# Patient Record
(demographics unavailable — no encounter records)

---

## 2024-12-20 NOTE — HISTORY OF PRESENT ILLNESS
[Result of repetitive motion] : result of repetitive motion [3] : 3 [2] : 2 [Localized] : localized [Leisure] : leisure [Social interactions] : social interactions [Rest] : rest [Full time] : Work status: full time [de-identified] : 34 year old RHD male with pain in the left shoulder, symptoms started a couple months ago after playing golf. Recurred for 5 or 6 days. Pain with reaching over head, behind back and lifting, pushing. No radicular complaints. He denies prior history of trouble with the shoulder.  Aleve and tylenol with some help [] : no [FreeTextEntry1] : l shoulder [FreeTextEntry5] : shoulder pain for a week ; was golfing 2 months ago and poss from swinging golf club but only had pain for a few days  [FreeTextEntry9] : Aleve [de-identified] : over stretching  [de-identified] :

## 2024-12-20 NOTE — DISCUSSION/SUMMARY
[de-identified] : MRI left shoulder eval for labral tear   RE:  KATHERIN SORENSON   Acct #- [id number]   Attention:  Nurse Reviewer /Medical Director    Based on my patient's condition, I strongly believe that the MRI is medically.necessary.   The patient has failed oral meds, injections and PT and conservative treatment in combination or by themselves and therefore needs the MRI.   The MRI will dictate further treatment t recommendations. modify activities try OTC meds ice as needed try topical lidocaine for pain control reviewed current medications used by this patient home exercises for functional return

## 2024-12-20 NOTE — PHYSICAL EXAM
[Sitting] : sitting [Mild] : mild [5___] : external rotation 5[unfilled]/5 [Left] : left shoulder [There are no fractures, subluxations or dislocations. No significant abnormalities are seen] : There are no fractures, subluxations or dislocations. No significant abnormalities are seen [] : positive anterior shift [TWNoteComboBox7] : active forward flexion 165 degrees [TWNoteComboBox6] : internal rotation L4 [de-identified] : external rotation 80 degrees

## 2024-12-27 NOTE — DISCUSSION/SUMMARY
[de-identified] : modify activities use elastic sleeve for structural support try OTC meds ice as needed try topical lidocaine for pain control reviewed current medications used by this patient home exercises for functional returnposs surg decompression of cyst and labral repair on US I did not cyst to be aspirated so CSI was done  12/27/2024    RE:  KATHERINMT SORENSON   Acct #- [id number]    Attention:  Nurse Reviewer /Medical Director  I am writing this letter as a medical necessity for PT program. Patient has tried analgesics, non-steroid anti-inflammatory agents,  hot or cold compresses,injections of corticosteroids, etc)  which in combination or by themselves has not worked. Based on my patient's condition, I strongly believe that the PT is medically needed.   Thank you for your time and consideration.     will try csi

## 2024-12-27 NOTE — HISTORY OF PRESENT ILLNESS
[3] : 3 [Localized] : localized [Nothing helps with pain getting better] : Nothing helps with pain getting better [Full time] : Work status: full time [Result of repetitive motion] : result of repetitive motion [2] : 2 [Leisure] : leisure [Social interactions] : social interactions [Rest] : rest [de-identified] : pain in the left shoulder, made symptoms worse yesterday when rotated the shoulder. . Pain with reaching over head, behind back and lifting, pushing. No radicular complaints. He denies prior history of trouble with the shoulder.  Had MRI, OTCS as needed [] : no [FreeTextEntry1] : l shoulder [FreeTextEntry5] : shoulder pain for a week ; was golfing 2 months ago and poss from swinging golf club but only had pain for a few days  [FreeTextEntry9] : Aleve [de-identified] : over stretching  [de-identified] : MRI [facility] [de-identified] :

## 2024-12-27 NOTE — PHYSICAL EXAM
[Left] : left shoulder [Sitting] : sitting [Mild] : mild [5___] : external rotation 5[unfilled]/5 [] : no swelling [TWNoteComboBox7] : active forward flexion 165 degrees [TWNoteComboBox6] : internal rotation L4 [de-identified] : external rotation 80 degrees

## 2024-12-27 NOTE — DATA REVIEWED
[MRI] : MRI [Left] : left [Shoulder] : shoulder [Report was reviewed and noted in the chart] : The report was reviewed and noted in the chart [I reviewed the films/CD and agree] : I reviewed the films/CD and agree [FreeTextEntry1] : poss labral tear, has para labral cyst

## 2025-04-09 NOTE — REASON FOR VISIT
[FreeTextEntry2] : NEW CONDITION 4/9/25 This is a 35 year old RHD M  with left shoulder pain after swinging hard at a golf ball, he felt a clunk and acute pain in October 2024.  No prior history.  His pain lingered for a few days, then around late December 2024, he reached [place] and he felt more pain.  Reaching is uncomfortable.  He has been doing PT since January 2025.  There is continued discomfort.  There can be tingling down the arm.  He had an injection in December 2024 with Dr. Vaughn, which didn't seem to do much.  He had an MRI.

## 2025-04-09 NOTE — PHYSICAL EXAM
[Left] : left shoulder [Standing] : standing [Mild] : mild [5 ___] : forward flexion 5[unfilled]/5 [5___] : external rotation 5[unfilled]/5 [Right] : right shoulder [Sitting] : sitting [] : negative posterior shift [FreeTextEntry8] : There is trace AC discomfort.  [de-identified] : There is a 1+ sulcus sign. Anterior apprehension is unimpressive. [FreeTextEntry9] : IR is to T12 [TWNoteComboBox4] : passive forward flexion 165 degrees [de-identified] : external rotation 75 degrees [TWNoteComboBox5] : internal rotation at 90 degrees of abduction 70 degrees

## 2025-04-09 NOTE — HISTORY OF PRESENT ILLNESS
[de-identified] : 4/9/2025-Left shoulder pain [] : no [de-identified] : 12/2024 [de-identified] : Dr Vaughn [de-identified] : MRI

## 2025-04-09 NOTE — ASSESSMENT
[FreeTextEntry1] : ___ We discussed the underlying pathology. This is an acute complicated issue.  Cysts and labral tears such as this are indicated for surgery. He does not want to proceed and ruin his summer.  He wants to hold off on PT as well to do a HEP. Treatment options reviewed. Sleeper stretch demonstrated and advised with sheet provided.  A Medrol dose pack is prescribed, dispense 1, to be taken as directed, with risks of GI symptoms reviewed. He will follow up in 4 weeks.  Cautions discussed. A repeat MRI is a consideration.  Questions answered.  Patient seen by Dr. Hans Turk, who determined the assessment and plan. Diane PATIÑO participated in the care of the patient, including the history and physical exam. IRena, am scribing for Dr. Hans Turk in his presence for the chief complaint, physical exam, studies, assessment, and/or plan.

## 2025-04-09 NOTE — DATA REVIEWED
[FreeTextEntry1] : SHOULDER XR LEFT (Axillary/[facility]) taken and reviewed on 4/9/25: The clinically significant findings include no GH or AC arthritis.  MRI LEFT SHOULDER [facility] 12/23/24: I independently reviewed the images on 4/9/25 and my interpretation is that the clinically significant findings include there is a large anterior inferior paralabral cyst.  XR L SHOULDER (AP/Outlet) 12/20/24: I independently reviewed the images on 4/9/25 and my interpretation is that the clinically significant findings include a Type II acromion.

## 2025-04-30 NOTE — ASSESSMENT
[FreeTextEntry1] : ___ We discussed his course.  He is indicated for L shoulder arthroscopy, cyst decompression, and labral repair. He doesn't want to pursue that at this moment.  Meloxicam 15 mg QD x 10-14 days is renewed with risks of GI symptoms reviewed. A repeat MRI is planned in 3 months to assess the cyst and tears.  He will continue with HEP.  Questions answered.   Patient seen by Dr. Hans Turk, who determined the assessment and plan. Diane PATIÑO participated in the care of the patient, including the history and physical exam. Rena KATZ, am scribing for Dr. Hans Turk in his presence for the chief complaint, physical exam, studies, assessment, and/or plan.

## 2025-04-30 NOTE — HISTORY OF PRESENT ILLNESS
[6] : 6 [2] : 2 [Household chores] : household chores [Leisure] : leisure [Work] : work [Sleep] : sleep [Full time] : Work status: full time [FreeTextEntry1] : L shoulder [de-identified] : MRI [de-identified] : HEP

## 2025-04-30 NOTE — PHYSICAL EXAM
[Left] : left shoulder [Standing] : standing [Mild] : mild [5 ___] : forward flexion 5[unfilled]/5 [5___] : external rotation 5[unfilled]/5 [Right] : right shoulder [Sitting] : sitting [] : no sensory deficits [FreeTextEntry8] : There is trace AC discomfort.  [de-identified] : There is a 1+ sulcus sign. Anterior apprehension is unimpressive. [FreeTextEntry9] : IR is to T12 [TWNoteComboBox4] : passive forward flexion 165 degrees [de-identified] : external rotation 75 degrees [TWNoteComboBox5] : internal rotation at 90 degrees of abduction 70 degrees

## 2025-04-30 NOTE — REASON FOR VISIT
[FreeTextEntry2] : NEW CONDITION 4/9/25 This is a 35 year old RHD M  with left shoulder pain after swinging hard at a golf ball, he felt a clunk and acute pain in October 2024.  No prior history.  His pain lingered for a few days, then around late December 2024, he reached [place] and he felt more pain.  Reaching is uncomfortable.  He has been doing PT since January 2025.  There is continued discomfort.  There can be tingling down the arm.  He had an injection in December 2024 with Dr. Vaughn, which didn't seem to do much and also caused an adverse reaction.  He had an MRI. ON 4/30/25, he returns for revaluation of his left shoulder pain.  Mobic does help.  He has been doing his HEP.  The MDP helped initially.

## 2025-06-03 NOTE — HISTORY OF PRESENT ILLNESS
[de-identified] : KATHERIN is a 35 year male here today for Left shoulder. Patient notes he injured his left shoulder playing golf in Oct 2024. He swung and felt a clunk at that time. [facility] saw Dr. Vaughn [facility] He followed with some PT and let it rest. He then reaggravated the shoulder in Dec 2024 and felt a sharp pain when he went to reach for something. He then followed with Dr. Turk at [facility] and had an MRI ordered. Patient then went for another opinion about two weeks ago at [facility] with Dr. Clancy and during the physical exam his shoulder subluxed and he had a sharp increase in pain. New MRI was ordered by Dr. Turk. He presents today for surgical consult. He endorses catching, and numbness radiating to his L thumb and occasionally his 4th and 5th digits. Notes worsening pain. Patient works as an .

## 2025-06-03 NOTE — PHYSICAL EXAM
[de-identified] : General: Well appearing, no acute distress Neurologic: A&Ox3, No focal deficits Head: NCAT without abrasions, lacerations, or ecchymosis to head, face, or scalp Eyes: No scleral icterus, no gross abnormalities Respiratory: Equal chest wall expansion bilaterally, no accessory muscle use Lymphatic: No lymphadenopathy palpated Skin: Warm and dry Psychiatric: Normal mood and affect  Examination of the left shoulder reveals no obvious deformity.  There is no evidence of muscle atrophy.  The skin is intact.  There is no swelling, erythema, or ecchymosis.  The patient is nontender to palpation throughout the left shoulder including the anterior aspect of the shoulder near the bicipital groove, a.c. joint, trapezius and posterior shoulder.  The patient's active range of motion is as follows: Forward flexion to 175 degrees with hitching. External rotation to 75 degrees and internal rotation to an upper lumbar level.  The patient has 5 out of 5 strength to forward flexion with pronation, internal and external rotation.  The patient has negative Lee and Neer's tests.  There is no pain with cross body abduction testing.  There is a negative a.c. compression test and no sulcus sign present.  There is a negative liftoff test and negative yergason test.  The compartments of the arm are soft and nontender without evidence of DVT or compartment syndrome.  The patient is grossly neurovascularly intact distally. [de-identified] : MRI L Shoulder [facility] DOS: 05/30/2025  Impression: 1. Findings suggesting recent (potentially subacute) posterior dislocation episode with marrow edema in the anterior aspect of the humeral head centrally measuring 1.7 cm in addition to diffuse posterior labral tearing with capsulitis extending into the superior and inferior labrum posteriorly, tiny paralabral cyst posterior to the glenoid measuring 3-4 mm and a large inferior paralabral cyst centrally measuring 1.8 cm with slight marrow edema and mild chronic appearing remodeling of the posterior inferior glenoid without evidence of loose body. 2. No evidence of biceps tendon tear, rotator cuff tendon tear or acute acromioclavicular joint injury with mild acromioclavicular joint hypertrophy. 3. There is new marrow edema in the humeral head compared to prior exam and progression of labral tearing, most prominent posteriorly. Inferior paralabral cyst appears moderately smaller than prior study.    MRI L Shoulder [facility] DOS: 12/23/2024  Impression: Focal intrasubstance tear in the inferior labrum generating a 2.6 x 2.0 x 1.9 cm  and lobulated paralabral cyst.

## 2025-06-03 NOTE — ADDENDUM
[FreeTextEntry1] : Documented by Ayala Tang acting as a scribe for Dr. Syed on 06/03/2025. All medical record entries made by the Scribe were at my, Dr. Syed's, direction and personally dictated by me on 06/03/2025. I have reviewed the chart and agree that the record accurately reflects my personal performance of the history, physical exam, procedure and imaging.

## 2025-06-03 NOTE — HISTORY OF PRESENT ILLNESS
[de-identified] : KATHERIN is a 35 year male here today for Left shoulder. Patient notes he injured his left shoulder playing golf in Oct 2024. He swung and felt a clunk at that time. Initially saw Dr. Vaughn at [facility] He followed with some PT and let it rest. He then reaggravated the shoulder in Dec 2024 and felt a sharp pain when he went to reach for something. He then followed with Dr. Turk at [facility] and had an MRI ordered. Patient then went for another opinion about two weeks ago at [facility] with [facility]. Clancy and during the physical exam his shoulder subluxed and he had a sharp increase in pain. New MRI was ordered by Dr. Turk. He presents today for surgical consult. He endorses catching, and numbness radiating to his L thumb and occasionally his 4th and 5th digits. Notes worsening pain. Patient works as an .

## 2025-06-03 NOTE — DISCUSSION/SUMMARY
[de-identified] : We had a thorough discussion regarding the nature of his pain, the pathophysiology, as well as all treatment options. I discussed operative and non-operative treatment modalities. All risks, benefits and alternatives to the proposed surgical procedure, L shoulder arthroscopy with bankart repair, capsulorrhaphy and possible remplissage, were discussed in great detail with the patient. Risks include but are not limited to pain, bleeding, infection, stiffness, medical complications (including DVT, PE, MI), and risks of anesthesia. The patient is considering surgical intervention for the Fall of 2025. Patient notes he has a follow up as well with Dr. Turk tomorrow, 06/04/2025. Recommend he attend PT for the mean time. Patient was given prescription of formal physical therapy that he will perform 2x/wk for 6-8 wks. All questions were answered and the patient verbalized understanding.

## 2025-06-03 NOTE — PHYSICAL EXAM
[de-identified] : General: Well appearing, no acute distress Neurologic: A&Ox3, No focal deficits Head: NCAT without abrasions, lacerations, or ecchymosis to head, face, or scalp Eyes: No scleral icterus, no gross abnormalities Respiratory: Equal chest wall expansion bilaterally, no accessory muscle use Lymphatic: No lymphadenopathy palpated Skin: Warm and dry Psychiatric: Normal mood and affect  Examination of the left shoulder reveals no obvious deformity.  There is no evidence of muscle atrophy.  The skin is intact.  There is no swelling, erythema, or ecchymosis.  The patient is nontender to palpation throughout the left shoulder including the anterior aspect of the shoulder near the bicipital groove, a.c. joint, trapezius and posterior shoulder.  The patient's active range of motion is as follows: Forward flexion to 175 degrees with hitching. External rotation to 75 degrees and internal rotation to an upper lumbar level.  The patient has 5 out of 5 strength to forward flexion with pronation, internal and external rotation.  The patient has negative Lee and Neer's tests.  There is no pain with cross body abduction testing.  There is a negative a.c. compression test and no sulcus sign present.  There is a negative liftoff test and negative yergason test.  The compartments of the arm are soft and nontender without evidence of DVT or compartment syndrome.  The patient is grossly neurovascularly intact distally. [de-identified] : MRI L Shoulder [facility] DOS: 05/30/2025  Impression: 1. Findings suggesting recent (potentially subacute) posterior dislocation episode with marrow edema in the anterior aspect of the humeral head centrally measuring 1.7 cm in addition to diffuse posterior labral tearing with capsulitis extending into the superior and inferior labrum posteriorly, tiny paralabral cyst posterior to the glenoid measuring 3-4 mm and a large inferior paralabral cyst centrally measuring 1.8 cm with slight marrow edema and mild chronic appearing remodeling of the posterior inferior glenoid without evidence of loose body. 2. No evidence of biceps tendon tear, rotator cuff tendon tear or acute acromioclavicular joint injury with mild acromioclavicular joint hypertrophy. 3. There is new marrow edema in the humeral head compared to prior exam and progression of labral tearing, most prominent posteriorly. Inferior paralabral cyst appears moderately smaller than prior study.    MRI L Shoulder [facility] DOS: 12/23/2024  Impression: Focal intrasubstance tear in the inferior labrum generating a 2.6 x 2.0 x 1.9 cm  and lobulated paralabral cyst.

## 2025-06-03 NOTE — CONSULT LETTER
[Dear  ___] : Dear  [unfilled], [Consult Letter:] : I had the pleasure of evaluating your patient, [unfilled]. [Please see my note below.] : Please see my note below. [Sincerely,] : Sincerely, [FreeTextEntry3] : Dr. Steve Syed

## 2025-06-03 NOTE — DISCUSSION/SUMMARY
[de-identified] : We had a thorough discussion regarding the nature of his pain, the pathophysiology, as well as all treatment options. I discussed operative and non-operative treatment modalities. All risks, benefits and alternatives to the proposed surgical procedure, L shoulder arthroscopy with bankart repair, capsulorrhaphy and possible remplissage, were discussed in great detail with the patient. Risks include but are not limited to pain, bleeding, infection, stiffness, medical complications (including DVT, PE, MI), and risks of anesthesia. The patient is considering surgical intervention for the Fall of 2025. Patient notes he has a follow up as well with Dr. Turk tomorrow, 06/04/2025. Recommend he attend PT for the mean time. Patient was given prescription of formal physical therapy that he will perform 2x/wk for 6-8 wks. All questions were answered and the patient verbalized understanding.

## 2025-06-04 NOTE — PHYSICAL EXAM
[Left] : left shoulder [Mild] : mild [5 ___] : forward flexion 5[unfilled]/5 [5___] : external rotation 5[unfilled]/5 [Right] : right shoulder [Sitting] : sitting [Moderate] : moderate [] : no sensory deficits [FreeTextEntry8] : There is trace AC discomfort.  [de-identified] : There is guarding. [de-identified] : There is guarding.  [FreeTextEntry9] : IR is to T12 [TWNoteComboBox6] : internal rotation L3 [TWNoteComboBox4] : passive forward flexion 165 degrees [de-identified] : external rotation 75 degrees [TWNoteComboBox5] : internal rotation at 90 degrees of abduction 70 degrees

## 2025-06-04 NOTE — ASSESSMENT
[FreeTextEntry1] : ___ We discussed his course.  He is indicated for L shoulder arthroscopy, cyst decompression, and labral repair. The tight posterior capsule is going to be an issue. He doesn't want to pursue that at this moment.  Celebrex, 200mg, q12h for 10-14 days is prescribed, with risks of GI symptoms reviewed. Physical Therapy is prescribed, 2x/week for 4-6 weeks. Clear cautions discussed.  Questions answered.   Patient seen by Dr. Hans Turk, who determined the assessment and plan. Diane PATIÑO participated in the care of the patient, including the history and physical exam. I, Rena Abdullahi, am scribing for Dr. Hans Turk in his presence for the chief complaint, physical exam, studies, assessment, and/or plan.

## 2025-06-04 NOTE — HISTORY OF PRESENT ILLNESS
[5] : 5 [4] : 4 [Full time] : Work status: full time [] : no [FreeTextEntry1] : Left shoulder [de-identified] : MRI Left shoulder [facility] [place] 05/30/2025 [de-identified] : HEP. Meloxicam as directed. Tylenol. [de-identified] :

## 2025-06-04 NOTE — REASON FOR VISIT
[FreeTextEntry2] : NEW CONDITION 4/9/25 This is a 35 year old RHD M  with left shoulder pain after swinging hard at a golf ball, he felt a clunk and acute pain in October 2024.  No prior history.  His pain lingered for a few days, then around late December 2024, he reached [place] and he felt more pain.  Reaching is uncomfortable.  He has been doing PT since January 2025.  There is continued discomfort.  There can be tingling down the arm.  He had an injection in December 2024 with Dr. Vaughn, which didn't seem to do much and also caused an adverse reaction.  He had an MRI. ON 4/30/25, he returns for revaluation of his left shoulder pain.  Mobic does help.  He has been doing his HEP.  The MDP helped initially. On 6/4/25, he was seeking other opinions regarding his best course of treatment. During one exam the shoulder dislocated.

## 2025-06-04 NOTE — DATA REVIEWED
[FreeTextEntry1] : LEFT SHOULDER MRI [facility] 5/30/25:  I independently reviewed the images for the first time on 6/4/25, and the clinically significant findings include there is a posterior labral tear. There is an inferior glenoid cyst. There is anterior HH edema. There are no major cuff tears. There is good muscle.   --PREVIOUSLY REVIEWED--  SHOULDER XR LEFT (Axillary/[facility]) taken and reviewed on 4/9/25: The clinically significant findings include no GH or AC arthritis.  MRI LEFT SHOULDER [facility] 12/23/24: I independently reviewed the images on 4/9/25 and my interpretation is that the clinically significant findings include there is a large anterior inferior paralabral cyst.  XR L SHOULDER (AP/Outlet) 12/20/24: I independently reviewed the images on 4/9/25 and my interpretation is that the clinically significant findings include a Type II acromion.

## 2025-07-09 NOTE — HISTORY OF PRESENT ILLNESS
[5] : 5 [3] : 3 [Full time] : Work status: full time [de-identified] : Patient is accompanied by his wife Anabel.  [] : no [FreeTextEntry1] : Left shoulder [de-identified] : PT/HEP, Advil/Tylenol/Naproxen PRN [de-identified] :

## 2025-07-09 NOTE — PHYSICAL EXAM
[Left] : left shoulder [5 ___] : forward flexion 5[unfilled]/5 [5___] : external rotation 5[unfilled]/5 [Right] : right shoulder [Sitting] : sitting [Minimal] : minimal [] : no sensory deficits [de-identified] : There is no guarding. [FreeTextEntry9] : IR is to T12 [TWNoteComboBox4] : passive forward flexion 165 degrees [de-identified] : external rotation 75 degrees [TWNoteComboBox5] : internal rotation at 90 degrees of abduction 70 degrees

## 2025-07-09 NOTE — PHYSICAL EXAM
[Left] : left shoulder [5 ___] : forward flexion 5[unfilled]/5 [5___] : external rotation 5[unfilled]/5 [Right] : right shoulder [Sitting] : sitting [Minimal] : minimal [] : no sensory deficits [de-identified] : There is no guarding. [FreeTextEntry9] : IR is to T12 [TWNoteComboBox4] : passive forward flexion 165 degrees [de-identified] : external rotation 75 degrees [TWNoteComboBox5] : internal rotation at 90 degrees of abduction 70 degrees

## 2025-07-09 NOTE — ASSESSMENT
[FreeTextEntry1] : ___ We discussed his course.  There no longer is a tight posterior capsule.  Overall, his symptoms appear improved, tho he still has sensations of instability.    We discussed treatment options, both non-operative and operative.  I do think he is a candidate for surgery.  Pain relief is a goal as well as improving function and stability.  I reviewed surgical techniques pictorially in the books that I co-edited.  Interscalene anesthesia, general anesthesia and postoperative pain management were discussed.  The importance of physical therapy postoperatively, the gradual recovery and the rehabilitation program with initial driving restrictions were noted.  The use of a Cryo-Cuff by [employer]irc[employer] and a sling for functional recovery was reviewed.  He understands there are no guarantees.  The benefits of decreased pain, increased function and restoring anatomy were outlined.  The risks were reviewed including, but not limited to, infection, failure, bleeding, stiffness, pain, clotting, fracture, re-tear, recurrent instability, hardware failure, deformity, functional limitation, scarring, neurovascular compromise, and narcotic use issues.  Under certain circumstances we discussed, further surgery may be indicated.  He understands that 100% recovery is not expected, and the desired level of function may not be achievable.  We discussed the potential for a prolonged recovery course and the potential for this to affect his activities, which could include a work regimen.  The posterior tightness may be a post-op issue, and the scapula symptoms may not resolve.  Their questions were answered.  Other opinions can be pursued, as we discussed.  He is considering to proceed with surgery.  This would include a left shoulder arthroscopy, debridement, cyst decompression, posterior labral repair, possible reverse remplissage.  We will schedule this at the earliest mutual convenient time.  He and his PT agreed to hold PT for now.  He will cont his HEP.  Naproxen 440mg, q12h for 10-14 days is prescribed, with risk of GI symptoms reviewed.  Patient seen by Dr. Hans Turk, who determined the assessment and plan. Diane PATIÑO participated in the care of the patient, including the history and physical exam.

## 2025-07-09 NOTE — HISTORY OF PRESENT ILLNESS
[5] : 5 [3] : 3 [Full time] : Work status: full time [de-identified] : Patient is accompanied by his wife Anabel.  [] : no [FreeTextEntry1] : Left shoulder [de-identified] : PT/HEP, Advil/Tylenol/Naproxen PRN [de-identified] :

## 2025-07-09 NOTE — REASON FOR VISIT
[Spouse] : spouse [FreeTextEntry2] : NEW CONDITION 4/9/25 This is a 35 year old RHD M  with left shoulder pain after swinging hard at a golf ball, he felt a clunk and acute pain in October 2024.  No prior history.  His pain lingered for a few days, then around late December 2024, he reached [place] and he felt more pain.  Reaching is uncomfortable.  He has been doing PT since January 2025.  There is continued discomfort.  There can be tingling down the arm.  He had an injection in December 2024 with Dr. Vaughn, which didn't seem to do much and also caused an adverse reaction.  He had an MRI. ON 4/30/25, he returns for revaluation of his left shoulder pain.  Mobic does help.  He has been doing his HEP.  The MDP helped initially. On 6/4/25, he was seeking other opinions regarding his best course of treatment. During one exam the shoulder dislocated.  On 7/9/25, he returns with soreness in the shoulder.  The pain is a little less.  He still feels unstable and weak.  He has continued with PT.  Celebrex upset his stomach.  The Naproxen helps to a degree. He describes the event on 5/21/25 as a subluxation. Anabel is here.